# Patient Record
Sex: MALE | Race: WHITE | NOT HISPANIC OR LATINO | URBAN - METROPOLITAN AREA
[De-identification: names, ages, dates, MRNs, and addresses within clinical notes are randomized per-mention and may not be internally consistent; named-entity substitution may affect disease eponyms.]

---

## 2017-01-06 ENCOUNTER — OFFICE VISIT (OUTPATIENT)
Dept: FAMILY MEDICINE CLINIC | Facility: CLINIC | Age: 27
End: 2017-01-06

## 2017-01-06 VITALS
SYSTOLIC BLOOD PRESSURE: 116 MMHG | BODY MASS INDEX: 27.1 KG/M2 | WEIGHT: 189.3 LBS | HEART RATE: 93 BPM | DIASTOLIC BLOOD PRESSURE: 78 MMHG | HEIGHT: 70 IN | OXYGEN SATURATION: 97 %

## 2017-01-06 DIAGNOSIS — L70.0 ACNE VULGARIS: ICD-10-CM

## 2017-01-06 DIAGNOSIS — G47.00 INSOMNIA, UNSPECIFIED TYPE: ICD-10-CM

## 2017-01-06 DIAGNOSIS — F41.9 ANXIETY: Primary | ICD-10-CM

## 2017-01-06 PROCEDURE — 99213 OFFICE O/P EST LOW 20 MIN: CPT | Performed by: NURSE PRACTITIONER

## 2017-01-06 NOTE — PROGRESS NOTES
Subjective   Johny Tracey is a 26 y.o. male.     Anxiety (6 month f/u) and Acne     Anxiety   Presents for follow-up visit. Symptoms include decreased concentration, depressed mood, excessive worry, insomnia and nervous/anxious behavior. Symptoms occur occasionally. Current severity: has improved since he has been on Celexa and Wellbutrin. The quality of sleep is good (has improved since he has been on Belsomra).     Compliance with medications is %.   Acne   This is a recurrent problem. The problem has been gradually improving. Associated symptoms comments: Skin breakouts on his face. Nothing aggravates the symptoms. Treatments tried: antibiotic cream. The treatment provided significant relief.     I have reviewed the patient's medical history in detail and updated the computerized patient record.    The following portions of the patient's history were reviewed and updated as appropriate: allergies, current medications, past family history, past medical history, past social history, past surgical history and problem list.    Review of Systems   Constitutional: Negative.    HENT: Negative.    Respiratory: Negative.    Cardiovascular: Negative.    Musculoskeletal: Negative.    Skin: Negative.    Neurological: Negative.    Psychiatric/Behavioral: Positive for decreased concentration. The patient is nervous/anxious and has insomnia.        Current Outpatient Prescriptions:   •  acyclovir (ZOVIRAX) 400 MG tablet, Take 400 mg by mouth Daily., Disp: , Rfl: 2  •  buPROPion XL (WELLBUTRIN XL) 150 MG 24 hr tablet, Take 150 mg by mouth Daily., Disp: , Rfl: 5  •  citalopram (CeleXA) 40 MG tablet, TAKE ONE TABLET BY MOUTH EVERY DAY, Disp: 30 tablet, Rfl: 5  •  clindamycin (CLINDAGEL) 1 % gel, APPLY TOPICALLY TWICE DAILY, Disp: 30 g, Rfl: 5  •  Suvorexant (BELSOMRA) 10 MG tablet, Take 1 tablet by mouth Every Night., Disp: 30 tablet, Rfl: 2  •  TRUVADA 200-300 MG per tablet, TAKE ONE TABLET BY MOUTH EVERY DAY FOR 30  DAYS, Disp: , Rfl: 5    Objective    Vitals:    01/06/17 0802   BP: 116/78   Pulse: 93   SpO2: 97%     Physical Exam   Constitutional: He appears well-developed and well-nourished.   HENT:   Right Ear: External ear normal.   Left Ear: External ear normal.   Mouth/Throat: Oropharynx is clear and moist.   Eyes: Conjunctivae and EOM are normal. Pupils are equal, round, and reactive to light.   Neck: Normal range of motion. Neck supple. No JVD present. No tracheal deviation present. No thyromegaly present.   Cardiovascular: Normal rate, regular rhythm, normal heart sounds and intact distal pulses.    Pulmonary/Chest: Effort normal and breath sounds normal.   Lymphadenopathy:     He has no cervical adenopathy.   Neurological: He is alert.   Skin: Skin is warm and dry.   Psychiatric:   No acute distress   Vitals reviewed.      Assessment/Plan   Johny was seen today for anxiety and acne.    Diagnoses and all orders for this visit:    Anxiety    Insomnia, unspecified type    Acne vulgaris     1.Patient is doing well on the Belsomra, Celexa and Wellbutrin.  Will continue all medications as prescribed.   2. His acne is clear today. Will continue with clindamycin as prescribed.  3. He is to return in 6 weeks to reorder his Belsomra and follow up on his insomnia.

## 2017-01-06 NOTE — MR AVS SNAPSHOT
Johny Tracey   2017 8:00 AM   Office Visit    Dept Phone:  [phone]   Encounter #:  [id number]    Provider:  JOSE Acharya   Department:  [facility] INTERNAL MEDICINE                Your Full Care Plan              Your Updated Medication List          This list is accurate as of: 17  8:19 AM.  Always use your most recent med list.                acyclovir 400 MG tablet   Commonly known as:  ZOVIRAX       buPROPion  MG 24 hr tablet   Commonly known as:  WELLBUTRIN XL       citalopram 40 MG tablet   Commonly known as:  CeleXA   TAKE ONE TABLET BY MOUTH EVERY DAY       clindamycin 1 % gel   Commonly known as:  CLINDAGEL   APPLY TOPICALLY TWICE DAILY       Suvorexant 10 MG tablet   Commonly known as:  BELSOMRA   Take 1 tablet by mouth Every Night.       TRUVADA 200-300 MG per tablet   Generic drug:  emtricitabine-tenofovir               Instructions     None    Patient Instructions History      Upcoming Appointments     Visit Type Date Time Department    OFFICE VISIT 2017  8:00 AM [facility]      [employer] Signup     [facility] [employer] allows you to send messages to your doctor, view your test results, renew your prescriptions, schedule appointments, and more. To sign up, go to [employer] and click on the Sign Up Now link in the New User? box. Enter your [employer] Activation Code exactly as it appears below along with the last four digits of your Social Security Number and your Date of Birth () to complete the sign-up process. If you do not sign up before the expiration date, you must request a new code.    [employer] Activation Code: [email or web address]  Expires: 2017  5:37 AM    If you have questions, you can email [email or web address] or call [phone] to talk to our [employer] staff. Remember, [employer] is NOT to be used for urgent needs. For medical emergencies, dial 911.               Other Info from Your  "Visit           Allergies     No Known Allergies      Reason for Visit     Anxiety 6 month f/u    Acne           Vital Signs     Blood Pressure Pulse Height Weight Oxygen Saturation Body Mass Index    116/78 (BP Location: Left arm, Patient Position: Sitting, Cuff Size: Adult) 93 69.5\" (176.5 cm) 189 lb 4.8 oz (85.9 kg) 97% 27.55 kg/m2    Smoking Status                   Current Every Day Smoker             "

## 2017-01-27 ENCOUNTER — OFFICE VISIT (OUTPATIENT)
Dept: FAMILY MEDICINE CLINIC | Facility: CLINIC | Age: 27
End: 2017-01-27

## 2017-01-27 VITALS
HEIGHT: 70 IN | TEMPERATURE: 99.8 F | WEIGHT: 195.1 LBS | DIASTOLIC BLOOD PRESSURE: 70 MMHG | SYSTOLIC BLOOD PRESSURE: 128 MMHG | BODY MASS INDEX: 27.93 KG/M2 | HEART RATE: 111 BPM | OXYGEN SATURATION: 95 %

## 2017-01-27 DIAGNOSIS — A60.02 HERPES SIMPLEX INFECTION OF OTHER SITE OF MALE GENITAL ORGAN: ICD-10-CM

## 2017-01-27 DIAGNOSIS — M25.50 ARTHRALGIA, UNSPECIFIED JOINT: Primary | ICD-10-CM

## 2017-01-27 PROCEDURE — 99213 OFFICE O/P EST LOW 20 MIN: CPT | Performed by: NURSE PRACTITIONER

## 2017-01-27 RX ORDER — IBUPROFEN 800 MG/1
800 TABLET ORAL EVERY 8 HOURS PRN
Qty: 90 TABLET | Refills: 1 | Status: SHIPPED | OUTPATIENT
Start: 2017-01-27 | End: 2017-03-13 | Stop reason: SDUPTHER

## 2017-01-27 RX ORDER — SULFAMETHOXAZOLE AND TRIMETHOPRIM 800; 160 MG/1; MG/1
TABLET ORAL
Refills: 0 | COMMUNITY
Start: 2017-01-22 | End: 2017-12-01

## 2017-01-27 RX ORDER — SULFAMETHOXAZOLE AND TRIMETHOPRIM 800; 160 MG/1; MG/1
1 TABLET ORAL
COMMUNITY
Start: 2017-01-22 | End: 2017-02-01

## 2017-01-27 NOTE — PROGRESS NOTES
Subjective   Johny Tracey is a 26 y.o. male who presents today for:    Elbow Pain (Lt was dx with cellulitus and still on antibiotic); Knee Pain; Ankle Pain; and Testicle Pain (Sores on scrotum )    HPI Comments: Mr. Tracey presents today with complaints of joint pain in his left wrist, his ankles and right knee. He was seen at the urgent care a week ago for left elbow pain and was diagnosed with cellulitis. He was given Bactrim DS for that and is still taking the medication. He also voices concern because he has noticed sores on his scrotum over the past week. He does have a history of herpes simplex with previous outbreaks around his mouth.     I have reviewed the patient's medical history in detail and updated the computerized patient record.    Mr. Tracey  reports that he has been smoking Cigarettes.  He has a 5.00 pack-year smoking history. He has never used smokeless tobacco. He reports that he drinks alcohol. He reports that he does not use illicit drugs.         Current Outpatient Prescriptions:   •  sulfamethoxazole-trimethoprim (BACTRIM DS) 800-160 MG per tablet, Take 1 tablet by mouth., Disp: , Rfl:   •  acyclovir (ZOVIRAX) 400 MG tablet, Take 400 mg by mouth Daily., Disp: , Rfl: 2  •  acyclovir (ZOVIRAX) 400 MG tablet, TAKE ONE-HALF TABLET BY MOUTH FIVE TIMES DAILY FOR 10 DAYS, Disp: 25 tablet, Rfl: 5  •  buPROPion XL (WELLBUTRIN XL) 150 MG 24 hr tablet, Take 150 mg by mouth Daily., Disp: , Rfl: 5  •  citalopram (CeleXA) 40 MG tablet, TAKE ONE TABLET BY MOUTH EVERY DAY, Disp: 30 tablet, Rfl: 5  •  clindamycin (CLINDAGEL) 1 % gel, APPLY TOPICALLY TWICE DAILY, Disp: 30 g, Rfl: 5  •  ibuprofen (ADVIL,MOTRIN) 800 MG tablet, Take 1 tablet by mouth Every 8 (Eight) Hours As Needed for mild pain (1-3)., Disp: 90 tablet, Rfl: 1  •  sulfamethoxazole-trimethoprim (BACTRIM DS,SEPTRA DS) 800-160 MG per tablet, TK 1 T PO  BID FOR 10 DAYS, Disp: , Rfl: 0  •  Suvorexant (BELSOMRA) 10 MG tablet, Take 1 tablet by mouth  "Every Night., Disp: 30 tablet, Rfl: 2  •  TRUVADA 200-300 MG per tablet, TAKE ONE TABLET BY MOUTH EVERY DAY FOR 30 DAYS, Disp: , Rfl: 5      The following portions of the patient's history were reviewed and updated as appropriate: allergies, current medications, past social history and problem list.    Review of Systems   Constitutional: Negative.    Respiratory: Negative.    Cardiovascular: Negative.    Genitourinary: Positive for genital sores (on his scrotum). Negative for scrotal swelling.   Musculoskeletal: Negative for joint swelling (join pain).   Neurological: Negative.          Objective   Vitals:    01/27/17 1004   BP: 128/70   BP Location: Right arm   Patient Position: Sitting   Cuff Size: Adult   Pulse: 111   Temp: 99.8 °F (37.7 °C)   TempSrc: Oral   SpO2: 95%   Weight: 195 lb 1.6 oz (88.5 kg)   Height: 69.5\" (176.5 cm)     Physical Exam   Constitutional: He is oriented to person, place, and time. He appears well-developed and well-nourished.   Cardiovascular: Normal rate, regular rhythm, normal heart sounds and intact distal pulses.    Pulmonary/Chest: Effort normal and breath sounds normal.   Genitourinary: Right testis shows no mass, no swelling and no tenderness. Left testis shows no mass, no swelling and no tenderness.   Genitourinary Comments: Has 3 drying vesicular lesions on his scrotum   Musculoskeletal: He exhibits tenderness (on his left wrist, right knee). He exhibits no edema.   He is limiting the movement of his left elbow, wrist and right knee because of the pain   Neurological: He is alert and oriented to person, place, and time. He has normal reflexes.   Skin: Skin is warm and dry.   Vitals reviewed.        Assessment/Plan   Johny was seen today for elbow pain, knee pain, ankle pain and testicle pain.    Diagnoses and all orders for this visit:    Arthralgia, unspecified joint  -     ibuprofen (ADVIL,MOTRIN) 800 MG tablet; Take 1 tablet by mouth Every 8 (Eight) Hours As Needed for mild " pain (1-3).    Herpes simplex infection of other site of male genital organ    1. Arthralgia. Patient does have full ROM of knee, wrist and ankles when asked to adduct, flex or abduct the joints. He does have pain with movement. He is to continue with the Bactrim DS as prescribed. He is to start Ibuprofen 800 mg three times a day as needed.  2. He is having a herpes simplex out break on his genitals. I have advised him to start taking the acyclovir as prescribed.  3. He is to follow up at his next scheduled appointment.

## 2017-01-27 NOTE — MR AVS SNAPSHOT
Johny ILDEFONSO Tracey   1/27/2017 10:00 AM   Office Visit    Dept Phone:  [phone]   Encounter #:  [id number]    Provider:  JOSE Acharya   Department:  [facility] INTERNAL MEDICINE                Your Full Care Plan              Today's Medication Changes          These changes are accurate as of: 1/27/17 10:23 AM.  If you have any questions, ask your nurse or doctor.               New Medication(s)Ordered:     ibuprofen 800 MG tablet   Commonly known as:  ADVIL,MOTRIN   Take 1 tablet by mouth Every 8 (Eight) Hours As Needed for mild pain (1-3).   Started by:  JOSE Acharya            Where to Get Your Medications      [place] [facility] [address] [place] [phone] [place] [phone]   [address] [facility] [address]     Phone:  [phone]     ibuprofen 800 MG tablet                  Your Updated Medication List          This list is accurate as of: 1/27/17 10:23 AM.  Always use your most recent med list.                * acyclovir 400 MG tablet   Commonly known as:  ZOVIRAX       * acyclovir 400 MG tablet   Commonly known as:  ZOVIRAX   TAKE ONE-HALF TABLET BY MOUTH FIVE TIMES DAILY FOR 10 DAYS       buPROPion  MG 24 hr tablet   Commonly known as:  WELLBUTRIN XL       citalopram 40 MG tablet   Commonly known as:  CeleXA   TAKE ONE TABLET BY MOUTH EVERY DAY       clindamycin 1 % gel   Commonly known as:  CLINDAGEL   APPLY TOPICALLY TWICE DAILY       ibuprofen 800 MG tablet   Commonly known as:  ADVIL,MOTRIN   Take 1 tablet by mouth Every 8 (Eight) Hours As Needed for mild pain (1-3).       * sulfamethoxazole-trimethoprim 800-160 MG per tablet   Commonly known as:  BACTRIM DS,SEPTRA DS       * BACTRIM -160 MG per tablet   Generic drug:  sulfamethoxazole-trimethoprim       Suvorexant 10 MG tablet   Commonly known as:  BELSOMRA   Take 1 tablet by mouth Every Night.        TRUVADA 200-300 MG per tablet   Generic drug:  emtricitabine-tenofovir       * Notice:  This list has 4 medication(s) that are the same as other medications prescribed for you. Read the directions carefully, and ask your doctor or other care provider to review them with you.            You Were Diagnosed With        Codes Comments    Arthralgia, unspecified joint    -  Primary ICD-10-CM: M25.50  ICD-9-CM: 719.40       Instructions     None    Patient Instructions History      Upcoming Appointments     Visit Type Date Time Department    ACUTE           2017 10:00 AM [facility]    OFFICE VISIT 2017  9:00 AM [facility]      [employer] Signup     [facility] [employer] allows you to send messages to your doctor, view your test results, renew your prescriptions, schedule appointments, and more. To sign up, go to [employer] and click on the Sign Up Now link in the New User? box. Enter your [employer] Activation Code exactly as it appears below along with the last four digits of your Social Security Number and your Date of Birth () to complete the sign-up process. If you do not sign up before the expiration date, you must request a new code.    [employer] Activation Code: [email or web address]  Expires: 2/10/2017 10:23 AM    If you have questions, you can email [email or web address] or call [phone] to talk to our [employer] staff. Remember, [employer] is NOT to be used for urgent needs. For medical emergencies, dial 911.               Other Info from Your Visit           Your Appointments     2017  9:00 AM EST   Office Visit with JOSE Acharya   [facility] (--)    [address] [place] [address]   [phone]           Arrive 15 minutes prior to appointment.              Allergies     No Known Allergies      Reason for Visit     Elbow Pain Lt was dx with cellulitus and still on antibiotic    Knee Pain   "   Ankle Pain     Testicle Pain Sores on scrotum       Vital Signs     Blood Pressure Pulse Temperature Height    128/70 (BP Location: Right arm, Patient Position: Sitting, Cuff Size: Adult) 111 99.8 °F (37.7 °C) (Oral) 69.5\" (176.5 cm)    Weight Oxygen Saturation Body Mass Index Smoking Status    195 lb 1.6 oz (88.5 kg) 95% 28.4 kg/m2 Current Every Day Smoker      Problems and Diagnoses Noted     Joint pain    -  Primary        "

## 2017-02-08 DIAGNOSIS — F41.9 ANXIETY: ICD-10-CM

## 2017-02-08 DIAGNOSIS — Z71.7 ENCOUNTER FOR HIV COUNSELING: ICD-10-CM

## 2017-02-08 RX ORDER — EMTRICITABINE AND TENOFOVIR DISOPROXIL FUMARATE 200; 300 MG/1; MG/1
TABLET, FILM COATED ORAL
Qty: 30 TABLET | Refills: 2 | Status: SHIPPED | OUTPATIENT
Start: 2017-02-08 | End: 2017-12-01 | Stop reason: SDUPTHER

## 2017-02-08 RX ORDER — BUPROPION HYDROCHLORIDE 150 MG/1
TABLET ORAL
Qty: 30 TABLET | Refills: 2 | Status: SHIPPED | OUTPATIENT
Start: 2017-02-08 | End: 2017-12-01 | Stop reason: SDUPTHER

## 2017-03-13 DIAGNOSIS — M25.50 ARTHRALGIA, UNSPECIFIED JOINT: ICD-10-CM

## 2017-03-13 RX ORDER — IBUPROFEN 800 MG/1
TABLET ORAL
Qty: 90 TABLET | Refills: 3 | Status: SHIPPED | OUTPATIENT
Start: 2017-03-13 | End: 2021-05-27

## 2017-03-13 RX ORDER — CITALOPRAM 40 MG/1
TABLET ORAL
Qty: 90 TABLET | Refills: 1 | Status: SHIPPED | OUTPATIENT
Start: 2017-03-13 | End: 2017-12-01 | Stop reason: SDUPTHER

## 2017-12-01 ENCOUNTER — OFFICE VISIT (OUTPATIENT)
Dept: FAMILY MEDICINE CLINIC | Facility: CLINIC | Age: 27
End: 2017-12-01

## 2017-12-01 VITALS
DIASTOLIC BLOOD PRESSURE: 70 MMHG | HEIGHT: 70 IN | BODY MASS INDEX: 29.43 KG/M2 | OXYGEN SATURATION: 100 % | SYSTOLIC BLOOD PRESSURE: 138 MMHG | TEMPERATURE: 98.2 F | HEART RATE: 97 BPM | WEIGHT: 205.6 LBS

## 2017-12-01 DIAGNOSIS — F41.9 ANXIETY: ICD-10-CM

## 2017-12-01 DIAGNOSIS — J01.40 ACUTE NON-RECURRENT PANSINUSITIS: ICD-10-CM

## 2017-12-01 DIAGNOSIS — A60.9 ANOGENITAL HSV INFECTION: ICD-10-CM

## 2017-12-01 DIAGNOSIS — L70.0 ACNE VULGARIS: Primary | ICD-10-CM

## 2017-12-01 PROCEDURE — 99214 OFFICE O/P EST MOD 30 MIN: CPT | Performed by: NURSE PRACTITIONER

## 2017-12-01 RX ORDER — CLINDAMYCIN PHOSPHATE 10 MG/G
GEL TOPICAL 2 TIMES DAILY
Qty: 30 G | Refills: 5 | Status: SHIPPED | OUTPATIENT
Start: 2017-12-01 | End: 2020-01-29 | Stop reason: SDUPTHER

## 2017-12-01 RX ORDER — AMOXICILLIN 500 MG/1
1000 CAPSULE ORAL 2 TIMES DAILY
Qty: 14 CAPSULE | Refills: 0 | Status: SHIPPED | OUTPATIENT
Start: 2017-12-01 | End: 2017-12-08

## 2017-12-01 RX ORDER — BUPROPION HYDROCHLORIDE 150 MG/1
150 TABLET ORAL EVERY MORNING
Qty: 30 TABLET | Refills: 5 | Status: SHIPPED | OUTPATIENT
Start: 2017-12-01 | End: 2020-01-29

## 2017-12-01 RX ORDER — EMTRICITABINE AND TENOFOVIR DISOPROXIL FUMARATE 200; 300 MG/1; MG/1
1 TABLET, FILM COATED ORAL DAILY
Qty: 30 TABLET | Refills: 5 | Status: SHIPPED | OUTPATIENT
Start: 2017-12-01 | End: 2020-01-29 | Stop reason: SDUPTHER

## 2017-12-01 RX ORDER — CITALOPRAM 40 MG/1
40 TABLET ORAL DAILY
Qty: 90 TABLET | Refills: 1 | Status: SHIPPED | OUTPATIENT
Start: 2017-12-01 | End: 2018-03-20 | Stop reason: SDUPTHER

## 2017-12-01 NOTE — PATIENT INSTRUCTIONS
You have been diagnosed with acute sinusitis. You have been prescribed an antibiotic along with symptomatic treatment.  You may take Mucinex D for relieving congestion and cough.  If you have high blood pressure, do not take Mucinex D, instead opting for plain Mucinex and Coricidin HBP.  Take Ibuprofen or Tylenol as needed for pain or fever.  Oral antihistamine, such as Zyrtec or Claritin may help reduce ear pressure and relieve some nasal symptoms.  A saline nasal spray may be used to keep nose clear from discharge.  Be sure that you are increasing your intake of clear to decaffeinated fluids and get plenty of rest.  If your symptoms worsen or persist follow up as needed.

## 2017-12-01 NOTE — PROGRESS NOTES
Subjective   Johny Tracey is a 27 y.o. male who presents today for:    Anal Itching (sweating and sores) and Sore Throat (ittermittent x 1-2 months )    HPI Comments: Mr. Tracey presents with complaints of an intermittent sore throat for the past month and laryngitis. He states that he has a history of seasonal allergies and reports that this fall his allergy symptoms have worsen. He take OTC allergy medications for her symptoms.     He also states that for the past 1 to 2 months he has been having anal itching, anal drainage and open sore around his rectum. He does have a history of herpes simplex and reports that he had not taken his Acyclovir for his symptoms.     I have reviewed the patient's medical history in detail and updated the computerized patient record.    Mr. Tracey  reports that he has been smoking Cigarettes.  He has a 5.00 pack-year smoking history. He has never used smokeless tobacco. He reports that he drinks alcohol. He reports that he does not use illicit drugs.     No Known Allergies    Current Outpatient Prescriptions:   •  ibuprofen (ADVIL,MOTRIN) 800 MG tablet, TAKE ONE TABLET BY MOUTH EVERY 8 HOURS AS NEEDED FOR MILD PAIN, Disp: 90 tablet, Rfl: 3  •  Suvorexant (BELSOMRA) 10 MG tablet, Take 1 tablet by mouth Every Night., Disp: 30 tablet, Rfl: 2  •  acyclovir (ZOVIRAX) 400 MG tablet, Take 400 mg by mouth Daily., Disp: , Rfl: 2  •  buPROPion XL (WELLBUTRIN XL) 150 MG 24 hr tablet, TAKE ONE TABLET BY MOUTH EVERY DAY FOR 30 DAYS, Disp: 30 tablet, Rfl: 2  •  citalopram (CeleXA) 40 MG tablet, TAKE ONE TABLET BY MOUTH EVERY DAY, Disp: 90 tablet, Rfl: 1  •  clindamycin (CLINDAGEL) 1 % gel, APPLY TOPICALLY TWICE DAILY, Disp: 30 g, Rfl: 5  •  TRUVADA 200-300 MG per tablet, TAKE ONE TABLET BY MOUTH EVERY DAY FOR 30 DAYS, Disp: , Rfl: 5      Review of Systems   Constitutional: Negative.  Negative for chills and fever.   HENT: Positive for congestion, postnasal drip, sore throat and voice change.   "  Respiratory: Positive for cough. Negative for shortness of breath and wheezing.    Gastrointestinal: Rectal pain: rectal itching, with sores.   Genitourinary: Negative.    Skin: Negative.    Neurological: Negative.          Objective   Vitals:    12/01/17 1014   BP: 138/70   BP Location: Right arm   Patient Position: Sitting   Cuff Size: Adult   Pulse: 97   Temp: 98.2 °F (36.8 °C)   TempSrc: Oral   SpO2: 100%   Weight: 205 lb 9.6 oz (93.3 kg)   Height: 69.5\" (176.5 cm)     Physical Exam   Constitutional: He is oriented to person, place, and time. He appears well-developed and well-nourished.   HENT:   Right Ear: Hearing, tympanic membrane, external ear and ear canal normal.   Left Ear: Hearing, tympanic membrane, external ear and ear canal normal.   Nose: Mucosal edema (red and swollen) and rhinorrhea (clear) present. Right sinus exhibits maxillary sinus tenderness and frontal sinus tenderness. Left sinus exhibits maxillary sinus tenderness and frontal sinus tenderness.   Mouth/Throat: Uvula is midline and mucous membranes are normal. Posterior oropharyngeal erythema present. No oropharyngeal exudate.   Neck: Normal range of motion. Neck supple.   Genitourinary:   Genitourinary Comments: Has multiple herpes lesions around his anus.   Lymphadenopathy:     He has no cervical adenopathy.   Neurological: He is alert and oriented to person, place, and time.   Skin: Skin is warm and dry.   Psychiatric:   No acute distress   Vitals reviewed.        Assessment/Plan   Johny was seen today for anal itching and sore throat.    Diagnoses and all orders for this visit:    Acne vulgaris  -     clindamycin (CLINDAGEL) 1 % gel; Apply  topically 2 (Two) Times a Day.    Anxiety  -     buPROPion XL (WELLBUTRIN XL) 150 MG 24 hr tablet; Take 1 tablet by mouth Every Morning.  -     citalopram (CeleXA) 40 MG tablet; Take 1 tablet by mouth Daily.    Acute non-recurrent pansinusitis  -     amoxicillin (AMOXIL) 500 MG capsule; Take 2 " capsules by mouth 2 (Two) Times a Day for 7 days.    Anogenital HSV infection    Other orders  -     TRUVADA 200-300 MG per tablet; Take 1 tablet by mouth Daily.    1. HSV, anogenital. He has been instructed to start taking his Acyclovir as prescribed.    2. I have reordered his maintenance medications.    3. You have been diagnosed with acute sinusitis. You have been prescribed an antibiotic along with symptomatic treatment.  You may take Mucinex D for relieving congestion and cough.  If you have high blood pressure, do not take Mucinex D, instead opting for plain Mucinex and Coricidin HBP.  Take Ibuprofen or Tylenol as needed for pain or fever.  Oral antihistamine, such as Zyrtec or Claritin may help reduce ear pressure and relieve some nasal symptoms.  A saline nasal spray may be used to keep nose clear from discharge.  Be sure that you are increasing your intake of clear to decaffeinated fluids and get plenty of rest.  If your symptoms worsen or persist follow up as needed.

## 2018-03-20 DIAGNOSIS — F41.9 ANXIETY: ICD-10-CM

## 2018-03-20 DIAGNOSIS — B00.2 PRIMARY HSV INFECTION OF MOUTH: ICD-10-CM

## 2018-03-20 RX ORDER — CITALOPRAM 40 MG/1
TABLET ORAL
Qty: 90 TABLET | Refills: 1 | Status: SHIPPED | OUTPATIENT
Start: 2018-03-20 | End: 2020-01-29

## 2018-03-20 RX ORDER — ACYCLOVIR 400 MG/1
TABLET ORAL
Qty: 25 TABLET | Refills: 3 | Status: SHIPPED | OUTPATIENT
Start: 2018-03-20 | End: 2021-05-27

## 2020-01-23 PROBLEM — F32.A DEPRESSION: Status: ACTIVE | Noted: 2017-12-01

## 2020-01-29 ENCOUNTER — OFFICE VISIT (OUTPATIENT)
Dept: INTERNAL MEDICINE | Facility: CLINIC | Age: 30
End: 2020-01-29

## 2020-01-29 VITALS
WEIGHT: 208 LBS | HEIGHT: 71 IN | DIASTOLIC BLOOD PRESSURE: 88 MMHG | SYSTOLIC BLOOD PRESSURE: 138 MMHG | BODY MASS INDEX: 29.12 KG/M2 | HEART RATE: 105 BPM | OXYGEN SATURATION: 99 %

## 2020-01-29 DIAGNOSIS — Z76.89 ENCOUNTER TO ESTABLISH CARE: ICD-10-CM

## 2020-01-29 DIAGNOSIS — Z00.00 ANNUAL PHYSICAL EXAM: Primary | ICD-10-CM

## 2020-01-29 DIAGNOSIS — F32.A DEPRESSION, UNSPECIFIED DEPRESSION TYPE: ICD-10-CM

## 2020-01-29 DIAGNOSIS — L70.0 ACNE VULGARIS: ICD-10-CM

## 2020-01-29 DIAGNOSIS — Z23 NEED FOR TDAP VACCINATION: ICD-10-CM

## 2020-01-29 DIAGNOSIS — F41.9 ANXIETY: ICD-10-CM

## 2020-01-29 DIAGNOSIS — Z79.899 ON PRE-EXPOSURE PROPHYLAXIS FOR HIV: ICD-10-CM

## 2020-01-29 LAB
APPEARANCE UR: CLEAR
BASOPHILS # BLD AUTO: 0.04 10*3/MM3 (ref 0–0.2)
BASOPHILS NFR BLD AUTO: 0.3 % (ref 0–1.5)
BILIRUB UR QL STRIP: NEGATIVE
COLOR UR: ABNORMAL
EOSINOPHIL # BLD AUTO: 0.09 10*3/MM3 (ref 0–0.4)
EOSINOPHIL # BLD AUTO: 0.8 % (ref 0.3–6.2)
ERYTHROCYTE [DISTWIDTH] IN BLOOD BY AUTOMATED COUNT: 13.2 % (ref 12.3–15.4)
GLUCOSE UR QL: NEGATIVE
HCT VFR BLD AUTO: 44.6 % (ref 37.5–51)
HGB BLD-MCNC: 15.6 G/DL (ref 13–17.7)
HGB UR QL STRIP: NEGATIVE
IMM GRANULOCYTES # BLD: 0.04 10*3/MM3 (ref 0–0.05)
IMM GRANULOCYTES NFR BLD: 0.3 % (ref 0–0.5)
KETONES UR QL STRIP: ABNORMAL
LEUKOCYTE ESTERASE UR QL STRIP: NEGATIVE
LYMPHOCYTES # BLD AUTO: 1.61 10*3/MM3 (ref 0.7–3.1)
LYMPHOCYTES NFR BLD AUTO: 13.9 % (ref 19.6–45.3)
MCH RBC QN AUTO: 31.4 PG (ref 26.6–33)
MCHC RBC AUTO-ENTMCNC: 35 G/DL (ref 31.5–35.7)
MCV RBC AUTO: 89.7 FL (ref 79–97)
MONOCYTES # BLD AUTO: 0.78 10*3/MM3 (ref 0.1–0.9)
MONOCYTES NFR BLD AUTO: 6.7 % (ref 5–12)
NEUTROPHILS # BLD AUTO: 9.05 10*3/MM3 (ref 1.7–7)
NEUTROPHILS NFR BLD AUTO: 78 % (ref 42.7–76)
NITRITE UR QL STRIP: NEGATIVE
NRBC BLD AUTO-RTO: 0 /100 WBC (ref 0–0.2)
PH UR STRIP.AUTO: 6 [PH] (ref 5–8)
PLATELET # BLD AUTO: 166 10*3/MM3 (ref 140–450)
PROTEIN: ABNORMAL
RBC # BLD AUTO: 4.97 10*6/MM3 (ref 4.14–5.8)
SP GR UR: ABNORMAL (ref 1–1.03)
UROBILINOGEN UR STRIP-MCNC: ABNORMAL MG/DL
WBC # BLD AUTO: 11.61 10*3/MM3 (ref 3.4–10.8)

## 2020-01-29 PROCEDURE — 90715 TDAP VACCINE 7 YRS/> IM: CPT | Performed by: NURSE PRACTITIONER

## 2020-01-29 PROCEDURE — 90471 IMMUNIZATION ADMIN: CPT | Performed by: NURSE PRACTITIONER

## 2020-01-29 PROCEDURE — 93000 ELECTROCARDIOGRAM COMPLETE: CPT | Performed by: NURSE PRACTITIONER

## 2020-01-29 PROCEDURE — 99213 OFFICE O/P EST LOW 20 MIN: CPT | Performed by: NURSE PRACTITIONER

## 2020-01-29 PROCEDURE — 99395 PREV VISIT EST AGE 18-39: CPT | Performed by: NURSE PRACTITIONER

## 2020-01-29 RX ORDER — CLINDAMYCIN PHOSPHATE 10 MG/G
GEL TOPICAL 2 TIMES DAILY
Qty: 60 G | Refills: 5 | Status: SHIPPED | OUTPATIENT
Start: 2020-01-29 | End: 2021-05-27 | Stop reason: SDUPTHER

## 2020-01-29 RX ORDER — EMTRICITABINE AND TENOFOVIR DISOPROXIL FUMARATE 200; 300 MG/1; MG/1
1 TABLET, FILM COATED ORAL DAILY
Qty: 30 TABLET | Refills: 5 | Status: SHIPPED | OUTPATIENT
Start: 2020-01-29 | End: 2020-02-10 | Stop reason: SDUPTHER

## 2020-01-29 NOTE — PROGRESS NOTES
Subjective     Johny Tracey is a 29 y.o. male.         He presents today to [facility], annual physical. He feels well overall today and has no complaints. He does report continued mood swings and bouts with anxiety and depression. He has PMH of anxiety and depression and previously treated with celexa and abilify but reports that he didn't feel as if they were working. He has not taking celexa for a year now as his Rx ran out and he did not return to see his previous PCP. He is concerned he may be bipolar since his dad was bipolar and he feels as if he is starting to act like his dad. He is a smoker but reports he does want to quite and has acutally cut down from 2 cartons of cigarettes every 2 weeks to 1 carton every 2 weeks and plans to continue titration. He works as a  for [employer]. He is a homosexual male and has had a healthy relationship with his partner for the last 3 years. He eats a healthy diet (low carb, no sugar) and just joined the gym and wants to get into a regular exercise routine. He previously took Truvada for HIV prophylaxis and asks to start that again today (he hasn't had it in over a year). Neither his dental or vision exams are UTD but he plans to have both of these done soon. He has PMH of HSV but has not had any outbreaks in the last year. He reports acyclovir caused stomach pain and diarrhea. Also has PMH of acne vulgaris and requests refill for clindagel.       The following portions of the patient's history were reviewed and updated as appropriate: allergies, current medications, past social history and problem list.    Past Medical History:   Diagnosis Date   • Acne vulgaris    • Anxiety    • Depression    • HSV (herpes simplex virus) anogenital infection          Current Outpatient Medications:   •  acyclovir (ZOVIRAX) 400 MG tablet, TAKE ONE-HALF TABLET BY MOUTH FIVE TIMES DAILY FOR 10 DAYS, Disp: 25 tablet, Rfl: 3  •  clindamycin (CLINDAGEL) 1 % gel, Apply  topically to  "the appropriate area as directed 2 (Two) Times a Day., Disp: 60 g, Rfl: 5  •  ibuprofen (ADVIL,MOTRIN) 800 MG tablet, TAKE ONE TABLET BY MOUTH EVERY 8 HOURS AS NEEDED FOR MILD PAIN, Disp: 90 tablet, Rfl: 3  •  TRUVADA 200-300 MG per tablet, Take 1 tablet by mouth Daily., Disp: 30 tablet, Rfl: 5    No Known Allergies    Review of Systems   Constitutional: Negative for fatigue and fever.   HENT: Negative for congestion, hearing loss and trouble swallowing.    Eyes: Negative for visual disturbance.   Respiratory: Negative for apnea, cough, chest tightness, shortness of breath and wheezing.    Cardiovascular: Negative for chest pain, palpitations and leg swelling.   Gastrointestinal: Negative for abdominal distention, abdominal pain, constipation, diarrhea, nausea and vomiting.   Endocrine: Negative for cold intolerance, heat intolerance, polydipsia, polyphagia and polyuria.   Genitourinary: Negative for difficulty urinating, dysuria, frequency and urgency.   Musculoskeletal: Negative for gait problem.   Skin: Negative for pallor and rash.   Neurological: Negative for speech difficulty, weakness and headaches.   Psychiatric/Behavioral: Positive for dysphoric mood (mild) and sleep disturbance. Negative for agitation, behavioral problems and confusion. The patient is nervous/anxious (mild).        Objective     /88 (BP Location: Left arm, Patient Position: Sitting, Cuff Size: Adult)   Pulse 105   Ht 179.7 cm (70.75\")   Wt 94.3 kg (208 lb)   SpO2 99%   BMI 29.22 kg/m²   Wt Readings from Last 3 Encounters:   01/29/20 94.3 kg (208 lb)   12/01/17 93.3 kg (205 lb 9.6 oz)   01/27/17 88.5 kg (195 lb 1.6 oz)     Temp Readings from Last 3 Encounters:   12/01/17 98.2 °F (36.8 °C) (Oral)   01/27/17 99.8 °F (37.7 °C) (Oral)     BP Readings from Last 3 Encounters:   01/29/20 138/88   12/01/17 138/70   01/27/17 128/70     Pulse Readings from Last 3 Encounters:   01/29/20 105   12/01/17 97   01/27/17 111       Physical Exam "   Constitutional: He is oriented to person, place, and time. He appears well-developed and well-nourished.   HENT:   Head: Normocephalic and atraumatic.   Right Ear: Hearing and tympanic membrane normal.   Left Ear: Hearing and tympanic membrane normal.   Nose: Nose normal.   Mouth/Throat: Uvula is midline, oropharynx is clear and moist and mucous membranes are normal. No tonsillar exudate.   Eyes: Pupils are equal, round, and reactive to light. Conjunctivae, EOM and lids are normal.   Neck: Normal range of motion. Carotid bruit is not present. No thyromegaly present.   Cardiovascular: Normal rate, regular rhythm, normal heart sounds and intact distal pulses.   No murmur heard.  Pulses:       Carotid pulses are 2+ on the right side, and 2+ on the left side.       Dorsalis pedis pulses are 2+ on the right side, and 2+ on the left side.   Pulmonary/Chest: Effort normal and breath sounds normal. No respiratory distress. He has no decreased breath sounds. He has no wheezes. He has no rhonchi.   Abdominal: Soft. Bowel sounds are normal. He exhibits no distension. There is no hepatosplenomegaly. There is no tenderness. There is no rebound and no guarding.   Musculoskeletal: Normal range of motion. He exhibits no edema, tenderness or deformity.   Lymphadenopathy:        Head (right side): No submental, no submandibular and no tonsillar adenopathy present.        Head (left side): No submental, no submandibular and no tonsillar adenopathy present.   Neurological: He is alert and oriented to person, place, and time. He has normal strength and normal reflexes. No cranial nerve deficit or sensory deficit. Coordination and gait normal.   Skin: Skin is warm, dry and intact.   Psychiatric: He has a normal mood and affect. His speech is normal and behavior is normal. Judgment and thought content normal. He is attentive.   Vitals reviewed.      Assessment/Plan     Johny was seen today for annual exam and establish  care.    Diagnoses and all orders for this visit:    Annual physical exam  -     CBC Auto Differential  -     Comprehensive Metabolic Panel  -     Lipid Panel  -     Urinalysis With Microscopic If Indicated (No Culture) - Urine, Clean Catch  -     TSH Rfx On Abnormal To Free T4  -     ECG 12 Lead    Anxiety  -     Ambulatory Referral to Psychiatry  -     Ambulatory Referral to Psychology    Depression, unspecified depression type  -     Ambulatory Referral to Psychiatry  -     Ambulatory Referral to Psychology    Acne vulgaris  -     clindamycin (CLINDAGEL) 1 % gel; Apply  topically to the appropriate area as directed 2 (Two) Times a Day.    On pre-exposure prophylaxis for HIV  -     TRUVADA 200-300 MG per tablet; Take 1 tablet by mouth Daily.    Encounter to establish care    Need for Tdap vaccination  -     Tdap Vaccine Greater Than or Equal To 8yo IM      He reports he will be sending biometric screening form to be filled out. Waist circumference today is 41 inches.    Encouraged healthy diet, regular exercise, maintenance of healthy weight, good sleep habits, avoidance of tobacco products and excessive alcohol.    Smoking cessation counseling given. Pt has cut back 50% on his cigarettes recently and plans to continue to cut back. He felt like Wellbutrin did not help. He can try OTC measures. He has not tried Chantix is not an option right now due to psychiatric disorders.    He will f/u in 6 months.      ECG 12 Lead  Date/Time: 1/29/2020 10:54 AM  Performed by: Marce Don APRN  Authorized by: Marce Don APRN   Previous ECG: no previous ECG available  Rhythm: sinus rhythm  Conduction: conduction normal  ST Segments: ST segments normal  QRS axis: normal  Other findings comments: Q waves, but no h/o MI    Clinical impression: non-specific ECG

## 2020-01-30 ENCOUNTER — TELEPHONE (OUTPATIENT)
Dept: INTERNAL MEDICINE | Facility: CLINIC | Age: 30
End: 2020-01-30

## 2020-01-30 ENCOUNTER — PRIOR AUTHORIZATION (OUTPATIENT)
Dept: INTERNAL MEDICINE | Facility: CLINIC | Age: 30
End: 2020-01-30

## 2020-01-30 LAB
ALBUMIN SERPL-MCNC: 4.5 G/DL (ref 3.5–5.2)
ALBUMIN/GLOB SERPL: 2.4 G/DL
ALP SERPL-CCNC: 72 U/L (ref 39–117)
ALT SERPL-CCNC: 16 U/L (ref 1–41)
AST SERPL-CCNC: 16 U/L (ref 1–40)
BILIRUB SERPL-MCNC: 0.5 MG/DL (ref 0.2–1.2)
BUN SERPL-MCNC: 18 MG/DL (ref 6–20)
BUN/CREAT SERPL: 24.7 (ref 7–25)
CALCIUM SERPL-MCNC: 9.1 MG/DL (ref 8.6–10.5)
CHLORIDE SERPL-SCNC: 102 MMOL/L (ref 98–107)
CHOLEST SERPL-MCNC: 182 MG/DL (ref 0–200)
CO2 SERPL-SCNC: 23.2 MMOL/L (ref 22–29)
CREAT SERPL-MCNC: 0.73 MG/DL (ref 0.76–1.27)
GLOBULIN SER CALC-MCNC: 1.9 GM/DL
GLUCOSE SERPL-MCNC: 71 MG/DL (ref 65–99)
HDLC SERPL-MCNC: 28 MG/DL (ref 40–60)
LDLC SERPL CALC-MCNC: 139 MG/DL (ref 0–100)
POTASSIUM SERPL-SCNC: 3.8 MMOL/L (ref 3.5–5.2)
PROT SERPL-MCNC: 6.4 G/DL (ref 6–8.5)
SODIUM SERPL-SCNC: 142 MMOL/L (ref 136–145)
TRIGL SERPL-MCNC: 76 MG/DL (ref 0–150)
TSH SERPL-ACNC: 1.14 UIU/ML (ref 0.27–4.2)
VLDLC SERPL-MCNC: 15.2 MG/DL

## 2020-01-30 NOTE — TELEPHONE ENCOUNTER
PT CALLED AND STATED THAT PER PHARM AND INSURANCE THE FOLLOWING MEDICATION NEEDS A PA    TRUVADA 200-300 MG per tablet       PT CALL BACK [phone]    [facility]  - CONFIRMED

## 2020-02-07 ENCOUNTER — TELEPHONE (OUTPATIENT)
Dept: INTERNAL MEDICINE | Facility: CLINIC | Age: 30
End: 2020-02-07

## 2020-02-07 NOTE — TELEPHONE ENCOUNTER
Pt called back inquiring about the fax for Truvada 200-300 mg.  Pt wanted to know if we got the fax.  Pt would like a call back: [phone]    Please advise.

## 2020-02-07 NOTE — TELEPHONE ENCOUNTER
Spoke to pt, informed him we didn't get any fax from pharmacy, gave him our fax #. He will call them and have them fax it again.

## 2020-02-10 DIAGNOSIS — Z79.899 ON PRE-EXPOSURE PROPHYLAXIS FOR HIV: ICD-10-CM

## 2020-02-10 RX ORDER — EMTRICITABINE AND TENOFOVIR DISOPROXIL FUMARATE 200; 300 MG/1; MG/1
1 TABLET, FILM COATED ORAL DAILY
Qty: 30 TABLET | Refills: 5 | Status: SHIPPED | OUTPATIENT
Start: 2020-02-10 | End: 2020-07-06

## 2020-02-10 NOTE — TELEPHONE ENCOUNTER
Sending this to [facility] per pt request.  [facility] never sent us anything.  I told pt to let us know if he has any trouble at [facility].

## 2020-02-10 NOTE — TELEPHONE ENCOUNTER
Fax was never received, so I called pt to ask what he would like us to do and that I can try to call [facility].  He wanted to try sending to [facility] in [place], as he has never had a problem filling it there.

## 2020-02-18 ENCOUNTER — TELEPHONE (OUTPATIENT)
Dept: INTERNAL MEDICINE | Facility: CLINIC | Age: 30
End: 2020-02-18

## 2020-02-18 NOTE — TELEPHONE ENCOUNTER
We received Rx clarification request for Truvada-request for ICD-10 from [facility].  I called pt to follow up since we last sent to [facility].  He said they could not fill, but they sent to [facility].  I filled in ICD-10 and faxed back to [facility].  Pt aware.

## 2020-03-03 DIAGNOSIS — Z79.899 ON PRE-EXPOSURE PROPHYLAXIS FOR HIV: Primary | ICD-10-CM

## 2020-03-04 ENCOUNTER — TELEPHONE (OUTPATIENT)
Dept: INTERNAL MEDICINE | Facility: CLINIC | Age: 30
End: 2020-03-04

## 2020-03-04 NOTE — TELEPHONE ENCOUNTER
----- Message from JOSE Morales sent at 3/3/2020  3:09 PM EST -----  Can we call and ask for him to come in around 4/29 for lab only visit to check kidney function since restarting Truvada? It is recommended we check his kidney function 3 months after starting Truvada and since it was over a year since he had been on it, it is like a new start. He does not have to see me.

## 2020-03-10 ENCOUNTER — TELEPHONE (OUTPATIENT)
Dept: INTERNAL MEDICINE | Facility: CLINIC | Age: 30
End: 2020-03-10

## 2020-03-10 NOTE — TELEPHONE ENCOUNTER
Patient called back to let Marce know that he already has his lab work scheduled.    Any questions please call.    Patient call back [phone]

## 2020-05-08 ENCOUNTER — RESULTS ENCOUNTER (OUTPATIENT)
Dept: INTERNAL MEDICINE | Facility: CLINIC | Age: 30
End: 2020-05-08

## 2020-05-08 DIAGNOSIS — Z79.899 ON PRE-EXPOSURE PROPHYLAXIS FOR HIV: ICD-10-CM

## 2020-07-06 DIAGNOSIS — Z79.899 ON PRE-EXPOSURE PROPHYLAXIS FOR HIV: ICD-10-CM

## 2020-07-06 RX ORDER — EMTRICITABINE AND TENOFOVIR DISOPROXIL FUMARATE 200; 300 MG/1; MG/1
TABLET, FILM COATED ORAL
Qty: 30 TABLET | Refills: 4 | Status: SHIPPED | OUTPATIENT
Start: 2020-07-06 | End: 2021-01-20

## 2020-07-14 ENCOUNTER — TELEPHONE (OUTPATIENT)
Dept: INTERNAL MEDICINE | Facility: CLINIC | Age: 30
End: 2020-07-14

## 2020-07-14 NOTE — TELEPHONE ENCOUNTER
PATIENT STATES THAT HIS SISTER N LAW LIVES WITH HIM AND IS A  AND SHE WAS NOTIFIED THAT SHE HAS BEEN AROUND SOMEONE AT HER WORK THAT TESTED POSITIVE FOR COVID-19      REQUESTING CALL BACK TO BE ADVISED WHERE TO BE TESTED AND WHAT HE SHOULD DO  [phone] (H)

## 2020-07-14 NOTE — TELEPHONE ENCOUNTER
Spoke with pt, his sister in law went today to be tested . Gave him the locations for the testing .

## 2020-07-24 ENCOUNTER — TELEPHONE (OUTPATIENT)
Dept: INTERNAL MEDICINE | Facility: CLINIC | Age: 30
End: 2020-07-24

## 2020-07-24 DIAGNOSIS — Z20.822 EXPOSURE TO COVID-19 VIRUS: Primary | ICD-10-CM

## 2020-07-24 NOTE — TELEPHONE ENCOUNTER
PATIENT CALLED STATING THAT HE WAS TESTED FOR COVID-19 TODAY (7/24/20) AT [facility]. THE PATIENT STATED THAT HE WAS ADVISED BY THE STAFF AT THE URGENT CARE THAT IT WOULD TAKE 2-7 DAYS FOR HIM TO GET THE RESULTS BACK, AND IN THE INTERIM, HE SHOULD SELF-QUARANTINE.    THE PATIENT REQUESTED FOR SAE [employer]ELCH TO WRITE HIM A LETTER TO HIS EMPLOYER ([employer]) EXCUSING HIM FROM WORK UNTIL HE RECEIVES HIS COVID-19 TEST RESULTS. THE PATIENT STATED THAT THE LETTER CAN BE ADDRESSED TO KEVIN MAC AND UPLOADED TO [employer].    THE PATIENT REQUESTED A CALL [phone] WHEN THIS MESSAGE HAS BEEN RECEIVED SO THAT HE CAN BE INFORMED OF THE STATUS OF THIS REQUEST.

## 2020-07-24 NOTE — TELEPHONE ENCOUNTER
PATIENT'S BOSS TESTED POSITIVE FOR COVID THIS MORNING. PATIENT WAS AROUND HIM ON Thursday. HE WOULDL TOMMY TO BE TESTED FOR COVID.     PLEASE ADVISE     PATIENT CALLBACK [phone]

## 2020-07-27 ENCOUNTER — DOCUMENTATION (OUTPATIENT)
Dept: INTERNAL MEDICINE | Facility: CLINIC | Age: 30
End: 2020-07-27

## 2020-07-27 NOTE — TELEPHONE ENCOUNTER
Sent letter to pt via [employer] to return to work on 8/3/20, pending results.  He will let us know if he needs anything else.

## 2020-07-31 ENCOUNTER — OFFICE VISIT (OUTPATIENT)
Dept: INTERNAL MEDICINE | Facility: CLINIC | Age: 30
End: 2020-07-31

## 2020-07-31 VITALS
OXYGEN SATURATION: 99 % | HEIGHT: 71 IN | WEIGHT: 212 LBS | SYSTOLIC BLOOD PRESSURE: 142 MMHG | BODY MASS INDEX: 29.68 KG/M2 | HEART RATE: 97 BPM | DIASTOLIC BLOOD PRESSURE: 76 MMHG

## 2020-07-31 DIAGNOSIS — F17.200 SMOKER: ICD-10-CM

## 2020-07-31 DIAGNOSIS — Z79.899 ON PRE-EXPOSURE PROPHYLAXIS FOR HIV: Primary | ICD-10-CM

## 2020-07-31 DIAGNOSIS — F31.81 BIPOLAR 2 DISORDER (HCC): ICD-10-CM

## 2020-07-31 PROCEDURE — 99213 OFFICE O/P EST LOW 20 MIN: CPT | Performed by: NURSE PRACTITIONER

## 2020-07-31 RX ORDER — VIT C/B6/B5/MAGNESIUM/HERB 173 50-5-6-5MG
500 CAPSULE ORAL DAILY
COMMUNITY
Start: 2020-02-12 | End: 2022-11-29

## 2020-07-31 RX ORDER — THIAMINE HCL 100 MG
500 TABLET ORAL DAILY
COMMUNITY
Start: 2020-02-07 | End: 2022-11-29

## 2020-07-31 RX ORDER — ASCORBIC ACID 125 MG
TABLET,CHEWABLE ORAL DAILY
COMMUNITY
Start: 2020-02-12 | End: 2022-11-29

## 2020-07-31 RX ORDER — LAMOTRIGINE 25 MG/1
100 TABLET ORAL DAILY
COMMUNITY
Start: 2020-02-07 | End: 2021-05-27

## 2020-07-31 NOTE — PROGRESS NOTES
"Subjective     Johny Tracey is a 30 y.o. male.         He presents for 6 month f/u for anxiety, bipolar, HIV prophylaxis in which he takes Truvada (will check labs today). He feels well overall today and has no complaints. He is seeing psych who is managing his bipolar and he reports a new start of Lamictal. He is smoker and had quit but with covid he went back to smoking 1 carton every 2 weeks.        The following portions of the patient's history were reviewed and updated as appropriate: allergies, current medications, past social history and problem list.    Past Medical History:   Diagnosis Date   • Acne vulgaris    • Anxiety    • Depression    • HSV (herpes simplex virus) anogenital infection          Current Outpatient Medications:   •  acyclovir (ZOVIRAX) 400 MG tablet, TAKE ONE-HALF TABLET BY MOUTH FIVE TIMES DAILY FOR 10 DAYS, Disp: 25 tablet, Rfl: 3  •  clindamycin (CLINDAGEL) 1 % gel, Apply  topically to the appropriate area as directed 2 (Two) Times a Day., Disp: 60 g, Rfl: 5  •  ibuprofen (ADVIL,MOTRIN) 800 MG tablet, TAKE ONE TABLET BY MOUTH EVERY 8 HOURS AS NEEDED FOR MILD PAIN, Disp: 90 tablet, Rfl: 3  •  lamoTRIgine (LaMICtal) 25 MG tablet, 100 mg., Disp: , Rfl:   •  Magnesium 500 MG tablet, Daily., Disp: , Rfl:   •  Menaquinone-7 (VITAMIN K2) 100 MCG capsule, Daily., Disp: , Rfl:   •  TRUVADA 200-300 MG per tablet, TAKE ONE TABLET BY MOUTH ONCE DAILY WITH OR WITHOUT FOOD. STORE IN ORIGINAL CONTAINER AT ROOM TEMPERATURE., Disp: 30 tablet, Rfl: 4  •  Turmeric (RA Turmeric) 500 MG capsule, Daily., Disp: , Rfl:     No Known Allergies    Review of Systems   Constitutional: Negative for fatigue and fever.   Respiratory: Negative for shortness of breath.    Cardiovascular: Negative for chest pain.   Gastrointestinal: Negative for abdominal pain, nausea and vomiting.   Neurological: Negative for weakness.       Objective     /76   Pulse 97   Ht 179.7 cm (70.75\")   Wt 96.2 kg (212 lb)   SpO2 " 99%   BMI 29.78 kg/m²   Wt Readings from Last 3 Encounters:   07/31/20 96.2 kg (212 lb)   01/29/20 94.3 kg (208 lb)   12/01/17 93.3 kg (205 lb 9.6 oz)     Temp Readings from Last 3 Encounters:   12/01/17 98.2 °F (36.8 °C) (Oral)   01/27/17 99.8 °F (37.7 °C) (Oral)     BP Readings from Last 3 Encounters:   07/31/20 142/76   01/29/20 138/88   12/01/17 138/70     Pulse Readings from Last 3 Encounters:   07/31/20 97   01/29/20 105   12/01/17 97       Physical Exam   Constitutional: He appears well-developed and well-nourished.   HENT:   Head: Normocephalic and atraumatic.   Cardiovascular: Normal rate, regular rhythm and normal heart sounds.   No murmur heard.  Pulmonary/Chest: Effort normal and breath sounds normal. No respiratory distress.   Musculoskeletal: Normal range of motion.   Neurological: He is alert.   Skin: Skin is warm and dry.   Psychiatric: He has a normal mood and affect. His behavior is normal. Judgment and thought content normal.   Vitals reviewed.      Assessment/Plan     Johny was seen today for follow-up.    Diagnoses and all orders for this visit:    On pre-exposure prophylaxis for HIV  -     Comprehensive Metabolic Panel  -     Urinalysis With Culture If Indicated - Urine, Clean Catch    Bipolar 2 disorder (CMS/HCC)  Comments:  stable, followed by psych    Smoker  Comments:  continue titration, can try OTCs, wellbutrin not effective in past    Encouraged healthy diet, regular exercise, maintenance of healthy weight, good sleep habits, avoidance of tobacco/vaping products and excessive alcohol.    He will f/u in 6 months as long as labs are normal.

## 2020-08-01 LAB
ALBUMIN SERPL-MCNC: 4.6 G/DL (ref 3.5–5.2)
ALBUMIN/GLOB SERPL: 2.3 G/DL
ALP SERPL-CCNC: 79 U/L (ref 39–117)
ALT SERPL-CCNC: 43 U/L (ref 1–41)
APPEARANCE UR: CLEAR
AST SERPL-CCNC: 19 U/L (ref 1–40)
BACTERIA #/AREA URNS HPF: ABNORMAL /HPF
BILIRUB SERPL-MCNC: 0.2 MG/DL (ref 0–1.2)
BILIRUB UR QL STRIP: NEGATIVE
BUN SERPL-MCNC: 21 MG/DL (ref 6–20)
BUN/CREAT SERPL: 24.1 (ref 7–25)
CALCIUM SERPL-MCNC: 8.7 MG/DL (ref 8.6–10.5)
CHLORIDE SERPL-SCNC: 106 MMOL/L (ref 98–107)
CO2 SERPL-SCNC: 25.2 MMOL/L (ref 22–29)
COLOR UR: YELLOW
CREAT SERPL-MCNC: 0.87 MG/DL (ref 0.76–1.27)
CRYSTALS URNS MICRO: ABNORMAL
EPI CELLS #/AREA URNS HPF: ABNORMAL /HPF (ref 0–10)
GLOBULIN SER CALC-MCNC: 2 GM/DL
GLUCOSE SERPL-MCNC: 89 MG/DL (ref 65–99)
GLUCOSE UR QL: NEGATIVE
HGB UR QL STRIP: NEGATIVE
KETONES UR QL STRIP: NEGATIVE
LEUKOCYTE ESTERASE UR QL STRIP: NEGATIVE
MICRO URNS: NORMAL
MICRO URNS: NORMAL
MUCOUS THREADS URNS QL MICRO: PRESENT /HPF
NITRITE UR QL STRIP: NEGATIVE
PH UR STRIP: 5.5 [PH] (ref 5–7.5)
POTASSIUM SERPL-SCNC: 4.6 MMOL/L (ref 3.5–5.2)
PROT SERPL-MCNC: 6.6 G/DL (ref 6–8.5)
PROT UR QL STRIP: NEGATIVE
RBC #/AREA URNS HPF: ABNORMAL /HPF (ref 0–2)
SODIUM SERPL-SCNC: 140 MMOL/L (ref 136–145)
SP GR UR: 1.03 (ref 1–1.03)
UNIDENT CRYS URNS QL MICRO: PRESENT /LPF
URINALYSIS REFLEX: NORMAL
UROBILINOGEN UR STRIP-MCNC: 0.2 MG/DL (ref 0.2–1)
WBC #/AREA URNS HPF: ABNORMAL /HPF (ref 0–5)

## 2020-08-03 DIAGNOSIS — R74.8 ELEVATED LIVER ENZYMES: Primary | ICD-10-CM

## 2020-09-16 ENCOUNTER — TELEMEDICINE (OUTPATIENT)
Dept: INTERNAL MEDICINE | Facility: CLINIC | Age: 30
End: 2020-09-16

## 2020-09-16 DIAGNOSIS — R05.9 COUGH: ICD-10-CM

## 2020-09-16 DIAGNOSIS — R06.02 SOB (SHORTNESS OF BREATH): ICD-10-CM

## 2020-09-16 DIAGNOSIS — R50.9 FEVER, UNSPECIFIED FEVER CAUSE: Primary | ICD-10-CM

## 2020-09-16 PROCEDURE — 99212 OFFICE O/P EST SF 10 MIN: CPT | Performed by: NURSE PRACTITIONER

## 2020-09-16 NOTE — PROGRESS NOTES
Subjective     Johny Tracey is a 30 y.o. male.         He presents for mychart video fever, headaches, fatigue, mild shortness of breath, new dry cough. His fever got as high as 100.9 but has normalized with Tylenol and ibuprofen.  Patient reports that his headache is also improved with Tylenol and ibuprofen.  He denies recent COVID exposure, loss of taste or smell, GI symptoms.  Was previously tested for COVID in July for exposure, at that time he had no symptoms, his test was negative.       The following portions of the patient's history were reviewed and updated as appropriate: allergies, current medications, past social history and problem list.    Past Medical History:   Diagnosis Date   • Acne vulgaris    • Anxiety    • Depression    • HSV (herpes simplex virus) anogenital infection          Current Outpatient Medications:   •  acyclovir (ZOVIRAX) 400 MG tablet, TAKE ONE-HALF TABLET BY MOUTH FIVE TIMES DAILY FOR 10 DAYS, Disp: 25 tablet, Rfl: 3  •  clindamycin (CLINDAGEL) 1 % gel, Apply  topically to the appropriate area as directed 2 (Two) Times a Day., Disp: 60 g, Rfl: 5  •  ibuprofen (ADVIL,MOTRIN) 800 MG tablet, TAKE ONE TABLET BY MOUTH EVERY 8 HOURS AS NEEDED FOR MILD PAIN, Disp: 90 tablet, Rfl: 3  •  lamoTRIgine (LaMICtal) 25 MG tablet, 100 mg., Disp: , Rfl:   •  Magnesium 500 MG tablet, Daily., Disp: , Rfl:   •  Menaquinone-7 (VITAMIN K2) 100 MCG capsule, Daily., Disp: , Rfl:   •  TRUVADA 200-300 MG per tablet, TAKE ONE TABLET BY MOUTH ONCE DAILY WITH OR WITHOUT FOOD. STORE IN ORIGINAL CONTAINER AT ROOM TEMPERATURE., Disp: 30 tablet, Rfl: 4  •  Turmeric (RA Turmeric) 500 MG capsule, Daily., Disp: , Rfl:     No Known Allergies    Review of Systems   Constitutional: Positive for fatigue and fever.   Respiratory: Positive for cough and shortness of breath.    Cardiovascular: Negative for chest pain and palpitations.   Neurological: Positive for headaches.       Objective     There were no vitals taken  for this visit.  Wt Readings from Last 3 Encounters:   07/31/20 96.2 kg (212 lb)   01/29/20 94.3 kg (208 lb)   12/01/17 93.3 kg (205 lb 9.6 oz)     Temp Readings from Last 3 Encounters:   12/01/17 98.2 °F (36.8 °C) (Oral)   01/27/17 99.8 °F (37.7 °C) (Oral)     BP Readings from Last 3 Encounters:   07/31/20 142/76   01/29/20 138/88   12/01/17 138/70     Pulse Readings from Last 3 Encounters:   07/31/20 97   01/29/20 105   12/01/17 97       Physical Exam  Constitutional:       General: He is not in acute distress.     Appearance: Normal appearance. He is not ill-appearing.   Pulmonary:      Effort: No respiratory distress.   Neurological:      Mental Status: He is alert and oriented to person, place, and time.   Psychiatric:         Mood and Affect: Mood normal.         Behavior: Behavior normal.         Thought Content: Thought content normal.         Judgment: Judgment normal.         Assessment/Plan     Diagnoses and all orders for this visit:    Fever, unspecified fever cause  -     COVID-19,[facility] ROUTINE, NP/OP SWAB IN TRANSPORT MEDIA OR ESWAB 72 HR TAT - Swab, Nasopharynx; Future  -     QUESTIONNAIRE SERIES    Cough  -     COVID-19,[facility] ROUTINE, NP/OP SWAB IN TRANSPORT MEDIA OR ESWAB 72 HR TAT - Swab, Nasopharynx; Future  -     QUESTIONNAIRE SERIES    SOB (shortness of breath)  -     COVID-19,[facility] ROUTINE, NP/OP SWAB IN TRANSPORT MEDIA OR ESWAB 72 HR TAT - Swab, Nasopharynx; Future  -     QUESTIONNAIRE SERIES    He will go and get tested at a [facility] urgent care.    He will self quarantine for at least 10 days.  Work note will be sent to patient via my chart.    Continue Tylenol and ibuprofen as needed.    Increase fluid intake and rest.    Return for worsening of sx.    Spent 10 minutes on the video visit with patient.

## 2021-01-20 DIAGNOSIS — Z79.899 ON PRE-EXPOSURE PROPHYLAXIS FOR HIV: ICD-10-CM

## 2021-01-20 RX ORDER — EMTRICITABINE AND TENOFOVIR DISOPROXIL FUMARATE 200; 300 MG/1; MG/1
TABLET, FILM COATED ORAL
Qty: 30 TABLET | Refills: 1 | Status: SHIPPED | OUTPATIENT
Start: 2021-01-20 | End: 2021-05-27 | Stop reason: SDUPTHER

## 2021-02-05 ENCOUNTER — OFFICE VISIT (OUTPATIENT)
Dept: INTERNAL MEDICINE | Facility: CLINIC | Age: 31
End: 2021-02-05

## 2021-02-05 VITALS
WEIGHT: 192 LBS | DIASTOLIC BLOOD PRESSURE: 76 MMHG | OXYGEN SATURATION: 99 % | BODY MASS INDEX: 26.88 KG/M2 | SYSTOLIC BLOOD PRESSURE: 118 MMHG | HEART RATE: 93 BPM | TEMPERATURE: 98.2 F | HEIGHT: 71 IN

## 2021-02-05 DIAGNOSIS — F31.81 BIPOLAR 2 DISORDER (HCC): ICD-10-CM

## 2021-02-05 DIAGNOSIS — Z79.899 ON PRE-EXPOSURE PROPHYLAXIS FOR HIV: ICD-10-CM

## 2021-02-05 DIAGNOSIS — Z00.00 ANNUAL PHYSICAL EXAM: Primary | ICD-10-CM

## 2021-02-05 DIAGNOSIS — F17.200 SMOKER: ICD-10-CM

## 2021-02-05 LAB
ALBUMIN SERPL-MCNC: 4.7 G/DL (ref 3.5–5.2)
ALBUMIN/GLOB SERPL: 2.2 G/DL
ALP SERPL-CCNC: 89 U/L (ref 39–117)
ALT SERPL-CCNC: 23 U/L (ref 1–41)
AST SERPL-CCNC: 17 U/L (ref 1–40)
BASOPHILS # BLD AUTO: 0.06 10*3/MM3 (ref 0–0.2)
BASOPHILS NFR BLD AUTO: 0.6 % (ref 0–1.5)
BILIRUB SERPL-MCNC: 0.3 MG/DL (ref 0–1.2)
BILIRUB UR QL STRIP: NEGATIVE
BUN SERPL-MCNC: 19 MG/DL (ref 6–20)
BUN/CREAT SERPL: 22.1 (ref 7–25)
CALCIUM SERPL-MCNC: 9.5 MG/DL (ref 8.6–10.5)
CHLORIDE SERPL-SCNC: 105 MMOL/L (ref 98–107)
CHOLEST SERPL-MCNC: 174 MG/DL (ref 0–200)
CLARITY UR: CLEAR
CO2 SERPL-SCNC: 26.8 MMOL/L (ref 22–29)
COLOR UR: YELLOW
CREAT SERPL-MCNC: 0.86 MG/DL (ref 0.76–1.27)
EOSINOPHIL # BLD AUTO: 0.1 10*3/MM3 (ref 0–0.4)
EOSINOPHIL NFR BLD AUTO: 0.9 % (ref 0.3–6.2)
ERYTHROCYTE [DISTWIDTH] IN BLOOD BY AUTOMATED COUNT: 13 % (ref 12.3–15.4)
GLOBULIN SER CALC-MCNC: 2.1 GM/DL
GLUCOSE SERPL-MCNC: 96 MG/DL (ref 65–99)
GLUCOSE UR STRIP-MCNC: NEGATIVE MG/DL
HCT VFR BLD AUTO: 46.8 % (ref 37.5–51)
HDLC SERPL-MCNC: 32 MG/DL (ref 40–60)
HGB BLD-MCNC: 15.9 G/DL (ref 13–17.7)
HGB UR QL STRIP.AUTO: NEGATIVE
IMM GRANULOCYTES # BLD AUTO: 0.03 10*3/MM3 (ref 0–0.05)
IMM GRANULOCYTES NFR BLD AUTO: 0.3 % (ref 0–0.5)
KETONES UR QL STRIP: NEGATIVE
LDLC SERPL CALC-MCNC: 131 MG/DL (ref 0–100)
LEUKOCYTE ESTERASE UR QL STRIP.AUTO: NEGATIVE
LYMPHOCYTES # BLD AUTO: 1.56 10*3/MM3 (ref 0.7–3.1)
LYMPHOCYTES NFR BLD AUTO: 14.8 % (ref 19.6–45.3)
MCH RBC QN AUTO: 31 PG (ref 26.6–33)
MCHC RBC AUTO-ENTMCNC: 34 G/DL (ref 31.5–35.7)
MCV RBC AUTO: 91.2 FL (ref 79–97)
MONOCYTES # BLD AUTO: 0.73 10*3/MM3 (ref 0.1–0.9)
MONOCYTES NFR BLD AUTO: 6.9 % (ref 5–12)
NEUTROPHILS # BLD AUTO: 8.07 10*3/MM3 (ref 1.7–7)
NEUTROPHILS NFR BLD AUTO: 76.5 % (ref 42.7–76)
NITRITE UR QL STRIP: NEGATIVE
NRBC BLD AUTO-RTO: 0 /100 WBC (ref 0–0.2)
PH UR STRIP.AUTO: 5.5 [PH] (ref 5–8)
PLATELET # BLD AUTO: 189 10*3/MM3 (ref 140–450)
POTASSIUM SERPL-SCNC: 4.5 MMOL/L (ref 3.5–5.2)
PROT SERPL-MCNC: 6.8 G/DL (ref 6–8.5)
PROT UR QL STRIP: NEGATIVE
RBC # BLD AUTO: 5.13 10*6/MM3 (ref 4.14–5.8)
SODIUM SERPL-SCNC: 140 MMOL/L (ref 136–145)
SP GR UR STRIP: 1.02 (ref 1–1.03)
TRIGL SERPL-MCNC: 57 MG/DL (ref 0–150)
TSH SERPL DL<=0.005 MIU/L-ACNC: 1.2 UIU/ML (ref 0.27–4.2)
UROBILINOGEN UR QL STRIP: NORMAL
VLDLC SERPL CALC-MCNC: 11 MG/DL (ref 5–40)
WBC # BLD AUTO: 10.55 10*3/MM3 (ref 3.4–10.8)

## 2021-02-05 PROCEDURE — 99395 PREV VISIT EST AGE 18-39: CPT | Performed by: NURSE PRACTITIONER

## 2021-02-05 PROCEDURE — 81003 URINALYSIS AUTO W/O SCOPE: CPT | Performed by: NURSE PRACTITIONER

## 2021-02-05 NOTE — PROGRESS NOTES
Subjective     Johny Tracey is a 30 y.o. male.         He presents for annual exam.  He feels well overall today and has no current complaints.  He has been following a keto diet and intermittent fasting-has lost 20 pounds in last 6 months.  He continues to smoke-smoking less than 1/2 ppd.  His bipolar is well controlled and is continues to be managed by psych.  He is also on preexposure prophylaxis for HIV, Truvada, and we are continuing to keep an eye on his liver enzymes and kidney function.  He recently got his first Covid vaccine, he works for [employer].       The following portions of the patient's history were reviewed and updated as appropriate: allergies, current medications, past social history and problem list.    Past Medical History:   Diagnosis Date   • Acne vulgaris    • Anxiety    • Depression    • HSV (herpes simplex virus) anogenital infection          Current Outpatient Medications:   •  acyclovir (ZOVIRAX) 400 MG tablet, TAKE ONE-HALF TABLET BY MOUTH FIVE TIMES DAILY FOR 10 DAYS, Disp: 25 tablet, Rfl: 3  •  clindamycin (CLINDAGEL) 1 % gel, Apply  topically to the appropriate area as directed 2 (Two) Times a Day., Disp: 60 g, Rfl: 5  •  emtricitabine-tenofovir (TRUVADA) 200-300 MG per tablet, TAKE ONE TABLET BY MOUTH ONCE DAILY WITH OR WITHOUT FOOD. STORE IN ORIGINAL CONTAINER AT ROOM TEMPERATURE., Disp: 30 tablet, Rfl: 1  •  ibuprofen (ADVIL,MOTRIN) 800 MG tablet, TAKE ONE TABLET BY MOUTH EVERY 8 HOURS AS NEEDED FOR MILD PAIN, Disp: 90 tablet, Rfl: 3  •  lamoTRIgine (LaMICtal) 25 MG tablet, Take 100 mg by mouth Daily., Disp: , Rfl:   •  Magnesium 500 MG tablet, Take 500 mg by mouth Daily., Disp: , Rfl:   •  Menaquinone-7 (VITAMIN K2) 100 MCG capsule, Take  by mouth Daily., Disp: , Rfl:   •  Turmeric (RA Turmeric) 500 MG capsule, Take 500 mg by mouth Daily., Disp: , Rfl:     No Known Allergies    Review of Systems   Constitutional: Negative for fatigue and fever.   HENT: Negative for congestion,  "hearing loss and trouble swallowing.    Eyes: Negative for visual disturbance.   Respiratory: Negative for apnea, cough, chest tightness, shortness of breath and wheezing.    Cardiovascular: Negative for chest pain, palpitations and leg swelling.   Gastrointestinal: Negative for abdominal distention, abdominal pain, constipation, diarrhea, nausea and vomiting.   Endocrine: Negative for cold intolerance, heat intolerance, polydipsia, polyphagia and polyuria.   Genitourinary: Negative for difficulty urinating, dysuria, frequency and urgency.   Musculoskeletal: Negative for gait problem.   Skin: Negative for pallor and rash.   Neurological: Negative for speech difficulty, weakness and headaches.   Psychiatric/Behavioral: Negative for agitation, behavioral problems, confusion, dysphoric mood and suicidal ideas. The patient is not nervous/anxious.        Objective     /76   Pulse 93   Temp 98.2 °F (36.8 °C)   Ht 179.7 cm (70.75\")   Wt 87.1 kg (192 lb)   SpO2 99%   BMI 26.97 kg/m²   Wt Readings from Last 3 Encounters:   02/05/21 87.1 kg (192 lb)   07/31/20 96.2 kg (212 lb)   01/29/20 94.3 kg (208 lb)     Temp Readings from Last 3 Encounters:   02/05/21 98.2 °F (36.8 °C)   12/01/17 98.2 °F (36.8 °C) (Oral)   01/27/17 99.8 °F (37.7 °C) (Oral)     BP Readings from Last 3 Encounters:   02/05/21 118/76   07/31/20 142/76   01/29/20 138/88     Pulse Readings from Last 3 Encounters:   02/05/21 93   07/31/20 97   01/29/20 105       Physical Exam  Vitals signs reviewed.   Constitutional:       Appearance: He is well-developed.   HENT:      Head: Normocephalic and atraumatic.      Right Ear: Hearing and tympanic membrane normal.      Left Ear: Hearing and tympanic membrane normal.      Nose: Nose normal.      Mouth/Throat:      Pharynx: Uvula midline.      Tonsils: No tonsillar exudate.   Eyes:      General: Lids are normal.      Conjunctiva/sclera: Conjunctivae normal.      Pupils: Pupils are equal, round, and reactive " to light.   Neck:      Musculoskeletal: Normal range of motion.      Thyroid: No thyromegaly.      Vascular: No carotid bruit.   Cardiovascular:      Rate and Rhythm: Normal rate and regular rhythm.      Pulses:           Carotid pulses are 2+ on the right side and 2+ on the left side.       Dorsalis pedis pulses are 2+ on the right side and 2+ on the left side.      Heart sounds: Normal heart sounds. No murmur.   Pulmonary:      Effort: Pulmonary effort is normal. No respiratory distress.      Breath sounds: Normal breath sounds. No decreased breath sounds, wheezing or rhonchi.   Abdominal:      General: Bowel sounds are normal. There is no distension.      Palpations: Abdomen is soft.      Tenderness: There is no abdominal tenderness. There is no guarding or rebound.   Musculoskeletal: Normal range of motion.         General: No tenderness or deformity.   Lymphadenopathy:      Head:      Right side of head: No submental, submandibular or tonsillar adenopathy.      Left side of head: No submental, submandibular or tonsillar adenopathy.   Skin:     General: Skin is warm and dry.   Neurological:      Mental Status: He is alert and oriented to person, place, and time.      Cranial Nerves: No cranial nerve deficit.      Sensory: No sensory deficit.      Coordination: Coordination normal.      Gait: Gait normal.      Deep Tendon Reflexes: Reflexes are normal and symmetric.   Psychiatric:         Attention and Perception: He is attentive.         Speech: Speech normal.         Behavior: Behavior normal.         Thought Content: Thought content normal.         Judgment: Judgment normal.         Assessment/Plan     Diagnoses and all orders for this visit:    1. Annual physical exam (Primary)  -     Comprehensive Metabolic Panel  -     Lipid Panel  -     Urinalysis With Culture If Indicated - Urine, Clean Catch  -     CBC & Differential  -     TSH Rfx On Abnormal To Free T4    2. On pre-exposure prophylaxis for HIV  -      Comprehensive Metabolic Panel    3. Bipolar 2 disorder (CMS/HCC)  Comments:  Well-controlled, managed by psych    4. Smoker  Comments:  Now smoking 1/2 ppd, continue to cut back until total cessation, counseling given    Encouraged healthy diet, regular exercise, maintenance of healthy weight, good sleep habits, avoidance of tobacco/vaping products and excessive alcohol.    He reports that he has received a pneumonia vaccine in the past but is unsure which one.  I recommended that he get a pneumonia vaccine but I want him to check with his insurance first to see which one is covered, if any.    He will follow-up in 6 months.

## 2021-04-16 ENCOUNTER — BULK ORDERING (OUTPATIENT)
Dept: CASE MANAGEMENT | Facility: OTHER | Age: 31
End: 2021-04-16

## 2021-04-16 DIAGNOSIS — Z23 IMMUNIZATION DUE: ICD-10-CM

## 2021-05-27 ENCOUNTER — OFFICE VISIT (OUTPATIENT)
Dept: FAMILY MEDICINE CLINIC | Facility: CLINIC | Age: 31
End: 2021-05-27

## 2021-05-27 VITALS
BODY MASS INDEX: 28.92 KG/M2 | WEIGHT: 206.6 LBS | HEART RATE: 77 BPM | OXYGEN SATURATION: 99 % | SYSTOLIC BLOOD PRESSURE: 120 MMHG | DIASTOLIC BLOOD PRESSURE: 82 MMHG | RESPIRATION RATE: 16 BRPM | HEIGHT: 71 IN

## 2021-05-27 DIAGNOSIS — F32.89 OTHER DEPRESSION: Primary | ICD-10-CM

## 2021-05-27 DIAGNOSIS — L70.0 ACNE VULGARIS: ICD-10-CM

## 2021-05-27 DIAGNOSIS — Z79.899 ON PRE-EXPOSURE PROPHYLAXIS FOR HIV: ICD-10-CM

## 2021-05-27 PROCEDURE — 99213 OFFICE O/P EST LOW 20 MIN: CPT | Performed by: INTERNAL MEDICINE

## 2021-05-27 RX ORDER — LAMOTRIGINE 100 MG/1
100 TABLET ORAL DAILY
COMMUNITY
Start: 2021-05-23

## 2021-05-27 RX ORDER — CLINDAMYCIN PHOSPHATE 10 MG/G
GEL TOPICAL 2 TIMES DAILY
Qty: 60 G | Refills: 5 | Status: SHIPPED | OUTPATIENT
Start: 2021-05-27 | End: 2022-11-29 | Stop reason: SDUPTHER

## 2021-05-27 RX ORDER — CLONAZEPAM 0.5 MG/1
TABLET ORAL
COMMUNITY
Start: 2021-05-21 | End: 2021-05-27

## 2021-05-27 RX ORDER — EMTRICITABINE AND TENOFOVIR DISOPROXIL FUMARATE 200; 300 MG/1; MG/1
1 TABLET, FILM COATED ORAL DAILY
Qty: 30 TABLET | Refills: 1 | Status: SHIPPED | OUTPATIENT
Start: 2021-05-27 | End: 2021-07-29

## 2021-05-27 NOTE — PROGRESS NOTES
05/27/2021    CC: Establish Care  .        HPI  Depression  Visit Type: initial  Onset of symptoms: more than 1 year ago  Progression since onset: controlled  Patient is not experiencing: depressed mood.  Frequency of symptoms: rarely   Severity: mild          Subjective   Johny Tracey is a 30 y.o. male.      The following portions of the patient's history were reviewed and updated as appropriate: allergies, current medications, past family history, past medical history, past social history, past surgical history and problem list.    Problem List  Patient Active Problem List   Diagnosis   • Anxiety   • Acne vulgaris   • Anogenital HSV infection   • Depression   • Smoker   • Bipolar 2 disorder (CMS/HCC)   • On pre-exposure prophylaxis for HIV       Past Medical History  Past Medical History:   Diagnosis Date   • Acne vulgaris    • Anxiety    • Depression    • HSV (herpes simplex virus) anogenital infection        Surgical History  Past Surgical History:   Procedure Laterality Date   • TONSILECTOMY, ADENOIDECTOMY, BILATERAL MYRINGOTOMY AND TUBES     • TONSILLECTOMY         Family History  Family History   Problem Relation Age of Onset   • Fibromyalgia Mother    • Dementia Mother    • Kidney failure Mother    • Diabetes Mother    • Hypertension Mother    • Anxiety disorder Mother    • COPD Mother    • Depression Mother    • Kidney disease Mother    • Mental illness Mother         Dementia   • Thyroid disease Mother    • Other Mother    • Heart disease Father    • Liver disease Father    • Mental illness Father         BiPolar   • Other Father    • Kidney disease Maternal Grandmother        Social History  Social History    Tobacco Use      Smoking status: Current Every Day Smoker        Packs/day: 0.50        Years: 10.00        Pack years: 5        Types: Cigarettes      Smokeless tobacco: Never Used       Is the Patient a current tobacco user? No    Allergies  No Known Allergies    Current Medications    Current  Outpatient Medications:   •  clindamycin (CLINDAGEL) 1 % gel, Apply  topically to the appropriate area as directed 2 (Two) Times a Day., Disp: 60 g, Rfl: 5  •  emtricitabine-tenofovir (TRUVADA) 200-300 MG per tablet, Take 1 tablet by mouth Daily. take with or without food. Store in origincal container at room temperature, Disp: 30 tablet, Rfl: 1  •  lamoTRIgine (LaMICtal) 100 MG tablet, Take 100 mg by mouth Daily., Disp: , Rfl:   •  Magnesium 500 MG tablet, Take 500 mg by mouth Daily., Disp: , Rfl:   •  Menaquinone-7 (VITAMIN K2) 100 MCG capsule, Take  by mouth Daily., Disp: , Rfl:   •  Turmeric (RA Turmeric) 500 MG capsule, Take 500 mg by mouth Daily., Disp: , Rfl:      Review of System  Review of Systems   Constitutional: Negative.    Eyes: Negative.    Respiratory: Negative.    Cardiovascular: Negative.    Endocrine: Negative.    Psychiatric/Behavioral: Negative for depressed mood.     I have reviewed and confirmed the accuracy of the ROS as documented by the MA/LPN/RN Carlo Chairez MD    Vitals:    05/27/21 0911   BP: 120/82   Pulse: 77   Resp: 16   SpO2: 99%     Body mass index is 29.02 kg/m².    Objective     Physical Exam  Physical Exam  Cardiovascular:      Rate and Rhythm: Normal rate and regular rhythm.      Pulses: Normal pulses.      Heart sounds: Normal heart sounds.   Pulmonary:      Effort: Pulmonary effort is normal.      Breath sounds: Normal breath sounds.   Neurological:      Mental Status: He is alert.         Assessment/Plan      This 30-year-old presents at this time for first visit with us.  He has been seen in the past by a nurse practitioner in the [facility] system in the [place] area.  He relates he is feeling fine has had no problems in the past several months.  He has a past medical history of depression is being addressed by his psychiatrist.  He is doing well.  He recently had physical examination.  I reviewed his comprehensive metabolic panel, lipid panel, CBC and TSH from  2/5/2021 is all essentially normal.    We will see the patient back for follow-up in about 6 to 7 months.        Diagnoses and all orders for this visit:    1. Other depression (Primary)    2. Acne vulgaris  -     clindamycin (CLINDAGEL) 1 % gel; Apply  topically to the appropriate area as directed 2 (Two) Times a Day.  Dispense: 60 g; Refill: 5    3. On pre-exposure prophylaxis for HIV  -     emtricitabine-tenofovir (TRUVADA) 200-300 MG per tablet; Take 1 tablet by mouth Daily. take with or without food. Store in origincal container at room temperature  Dispense: 30 tablet; Refill: 1      Plan:  1.)  Follow-up in 7 months  2.)  Continue current medications.       Carlo Chairez MD  05/27/2021

## 2021-07-29 DIAGNOSIS — Z79.899 ON PRE-EXPOSURE PROPHYLAXIS FOR HIV: ICD-10-CM

## 2021-07-29 RX ORDER — EMTRICITABINE AND TENOFOVIR DISOPROXIL FUMARATE 200; 300 MG/1; MG/1
1 TABLET, FILM COATED ORAL DAILY
Qty: 30 TABLET | Refills: 6 | Status: SHIPPED | OUTPATIENT
Start: 2021-07-29 | End: 2022-11-29 | Stop reason: SDUPTHER

## 2021-12-06 ENCOUNTER — TELEPHONE (OUTPATIENT)
Dept: FAMILY MEDICINE CLINIC | Facility: CLINIC | Age: 31
End: 2021-12-06

## 2022-04-28 DIAGNOSIS — Z79.899 ON PRE-EXPOSURE PROPHYLAXIS FOR HIV: ICD-10-CM

## 2022-04-28 RX ORDER — EMTRICITABINE AND TENOFOVIR DISOPROXIL FUMARATE 200; 300 MG/1; MG/1
TABLET, FILM COATED ORAL
Qty: 30 TABLET | Refills: 6 | OUTPATIENT
Start: 2022-04-28

## 2022-05-17 DIAGNOSIS — Z79.899 ON PRE-EXPOSURE PROPHYLAXIS FOR HIV: ICD-10-CM

## 2022-05-18 RX ORDER — EMTRICITABINE AND TENOFOVIR DISOPROXIL FUMARATE 200; 300 MG/1; MG/1
TABLET, FILM COATED ORAL
Qty: 30 TABLET | Refills: 6 | OUTPATIENT
Start: 2022-05-18

## 2022-11-29 ENCOUNTER — OFFICE VISIT (OUTPATIENT)
Dept: INTERNAL MEDICINE | Facility: CLINIC | Age: 32
End: 2022-11-29

## 2022-11-29 VITALS
WEIGHT: 241.6 LBS | SYSTOLIC BLOOD PRESSURE: 149 MMHG | HEIGHT: 70 IN | DIASTOLIC BLOOD PRESSURE: 91 MMHG | TEMPERATURE: 97.5 F | HEART RATE: 86 BPM | BODY MASS INDEX: 34.59 KG/M2 | OXYGEN SATURATION: 98 %

## 2022-11-29 DIAGNOSIS — Z79.899 ON PRE-EXPOSURE PROPHYLAXIS FOR HIV: ICD-10-CM

## 2022-11-29 DIAGNOSIS — F41.1 GENERALIZED ANXIETY DISORDER: ICD-10-CM

## 2022-11-29 DIAGNOSIS — F31.81 BIPOLAR II DISORDER: Primary | ICD-10-CM

## 2022-11-29 DIAGNOSIS — L70.0 ACNE VULGARIS: ICD-10-CM

## 2022-11-29 PROCEDURE — 99214 OFFICE O/P EST MOD 30 MIN: CPT

## 2022-11-29 RX ORDER — EMTRICITABINE AND TENOFOVIR DISOPROXIL FUMARATE 200; 300 MG/1; MG/1
1 TABLET, FILM COATED ORAL DAILY
Qty: 30 TABLET | Refills: 6 | Status: SHIPPED | OUTPATIENT
Start: 2022-11-29

## 2022-11-29 RX ORDER — CLINDAMYCIN PHOSPHATE 10 MG/G
GEL TOPICAL 2 TIMES DAILY
Qty: 60 G | Refills: 5 | Status: SHIPPED | OUTPATIENT
Start: 2022-11-29

## 2022-11-29 RX ORDER — CYCLOBENZAPRINE HCL 10 MG
TABLET ORAL
COMMUNITY
Start: 2022-11-16

## 2022-11-29 RX ORDER — EMTRICITABINE AND TENOFOVIR DISOPROXIL FUMARATE 200; 300 MG/1; MG/1
1 TABLET, FILM COATED ORAL DAILY
Qty: 30 TABLET | Refills: 6 | Status: SHIPPED | OUTPATIENT
Start: 2022-11-29 | End: 2022-11-29 | Stop reason: SDUPTHER

## 2022-11-29 RX ORDER — PAROXETINE HYDROCHLORIDE 20 MG/1
TABLET, FILM COATED ORAL
COMMUNITY
End: 2022-11-29

## 2022-11-29 RX ORDER — PAROXETINE HYDROCHLORIDE 20 MG/1
TABLET, FILM COATED ORAL
COMMUNITY
Start: 2022-10-31

## 2022-12-01 ENCOUNTER — PATIENT ROUNDING (BHMG ONLY) (OUTPATIENT)
Dept: INTERNAL MEDICINE | Facility: CLINIC | Age: 32
End: 2022-12-01

## 2022-12-02 LAB
ALBUMIN SERPL-MCNC: 4.7 G/DL (ref 3.5–5.2)
ALBUMIN/GLOB SERPL: 2.5 G/DL
ALP SERPL-CCNC: 87 U/L (ref 39–117)
ALT SERPL-CCNC: 41 U/L (ref 1–41)
AST SERPL-CCNC: 22 U/L (ref 1–40)
BASOPHILS # BLD AUTO: 0.05 10*3/MM3 (ref 0–0.2)
BASOPHILS NFR BLD AUTO: 0.5 % (ref 0–1.5)
BILIRUB SERPL-MCNC: 0.3 MG/DL (ref 0–1.2)
BUN SERPL-MCNC: 12 MG/DL (ref 6–20)
BUN/CREAT SERPL: 14 (ref 7–25)
CALCIUM SERPL-MCNC: 8.9 MG/DL (ref 8.6–10.5)
CHLORIDE SERPL-SCNC: 103 MMOL/L (ref 98–107)
CO2 SERPL-SCNC: 25.5 MMOL/L (ref 22–29)
CREAT SERPL-MCNC: 0.86 MG/DL (ref 0.76–1.27)
EGFRCR SERPLBLD CKD-EPI 2021: 116.5 ML/MIN/1.73
EOSINOPHIL # BLD AUTO: 0.12 10*3/MM3 (ref 0–0.4)
EOSINOPHIL NFR BLD AUTO: 1.2 % (ref 0.3–6.2)
ERYTHROCYTE [DISTWIDTH] IN BLOOD BY AUTOMATED COUNT: 13.1 % (ref 12.3–15.4)
GLOBULIN SER CALC-MCNC: 1.9 GM/DL
GLUCOSE SERPL-MCNC: 81 MG/DL (ref 65–99)
HCT VFR BLD AUTO: 43.8 % (ref 37.5–51)
HGB BLD-MCNC: 14.9 G/DL (ref 13–17.7)
IMM GRANULOCYTES # BLD AUTO: 0.03 10*3/MM3 (ref 0–0.05)
IMM GRANULOCYTES NFR BLD AUTO: 0.3 % (ref 0–0.5)
LAMOTRIGINE SERPL-MCNC: <1 UG/ML (ref 2–20)
LYMPHOCYTES # BLD AUTO: 2.08 10*3/MM3 (ref 0.7–3.1)
LYMPHOCYTES NFR BLD AUTO: 20.2 % (ref 19.6–45.3)
MCH RBC QN AUTO: 30.9 PG (ref 26.6–33)
MCHC RBC AUTO-ENTMCNC: 34 G/DL (ref 31.5–35.7)
MCV RBC AUTO: 90.9 FL (ref 79–97)
MONOCYTES # BLD AUTO: 0.67 10*3/MM3 (ref 0.1–0.9)
MONOCYTES NFR BLD AUTO: 6.5 % (ref 5–12)
NEUTROPHILS # BLD AUTO: 7.33 10*3/MM3 (ref 1.7–7)
NEUTROPHILS NFR BLD AUTO: 71.3 % (ref 42.7–76)
NRBC BLD AUTO-RTO: 0 /100 WBC (ref 0–0.2)
PLATELET # BLD AUTO: 177 10*3/MM3 (ref 140–450)
POTASSIUM SERPL-SCNC: 3.9 MMOL/L (ref 3.5–5.2)
PROT SERPL-MCNC: 6.6 G/DL (ref 6–8.5)
RBC # BLD AUTO: 4.82 10*6/MM3 (ref 4.14–5.8)
SODIUM SERPL-SCNC: 142 MMOL/L (ref 136–145)
WBC # BLD AUTO: 10.28 10*3/MM3 (ref 3.4–10.8)

## 2022-12-02 NOTE — PROGRESS NOTES
CBC and metabolic panel are unremarkable.     Lamotrigine level is below normal limits.     Please inform your other medical provider about the results through [employer]. Notify the office if any more information is needed.